# Patient Record
Sex: FEMALE | Race: WHITE
[De-identification: names, ages, dates, MRNs, and addresses within clinical notes are randomized per-mention and may not be internally consistent; named-entity substitution may affect disease eponyms.]

---

## 2019-12-18 ENCOUNTER — HOSPITAL ENCOUNTER (OUTPATIENT)
Dept: HOSPITAL 61 - PCVCIMAG | Age: 69
Discharge: HOME | End: 2019-12-18
Attending: INTERNAL MEDICINE
Payer: COMMERCIAL

## 2019-12-18 DIAGNOSIS — I73.9: Primary | ICD-10-CM

## 2019-12-18 DIAGNOSIS — E78.5: ICD-10-CM

## 2019-12-18 DIAGNOSIS — I10: ICD-10-CM

## 2019-12-18 PROCEDURE — 93017 CV STRESS TEST TRACING ONLY: CPT

## 2019-12-18 PROCEDURE — 93925 LOWER EXTREMITY STUDY: CPT

## 2019-12-18 PROCEDURE — A9500 TC99M SESTAMIBI: HCPCS

## 2019-12-18 PROCEDURE — 78452 HT MUSCLE IMAGE SPECT MULT: CPT

## 2019-12-18 NOTE — PCVCIMAG
EXAM: BILATERAL LOWER EXTREMITY ARTERIAL DUPLEX



INDICATION: Peripheral Arterial Disease. Leg pain.



FINDINGS: 

Right Leg: Satisfactory arterial waveforms throughout the

common/profunda/superficial femoral, popliteal, anterior tibial,

peroneal, and posterior tibial arteries. No flow limiting stenosis

seen.



Left Leg:  Satisfactory arterial waveforms throughout the

common/profunda/superficial femoral, popliteal, anterior tibial,

peroneal, and posterior tibial arteries. No flow limiting stenosis

seen.



IMPRESSION: 

No flow limiting stenosis in the right lower extremity.

No flow limiting stenosis in the left lower extremity.





LOC:[id number]

## 2019-12-18 NOTE — PCVCIMAG
--------------- APPROVED REPORT --------------





Imaging Protocol: Rest Tc-99m/Stress Tc-99m 1 day

Study performed:  12/18/2019 09:28:24



Indication: Dyspnea

Patient Location: Out-Patient

Stress Nurse: Gabriela Self RN, Alison Sutton RN

NM Tech:Jaz Bunnbarry [facility]



Ht: 5 ft 1 in Wt: 143 lbs BSA:  1.64 m2

HR: 51 bpm                      BP: 144/69 mmHg         BMI:  

27.01

Rhythm:  Sinus Bradycardia



Medical History

Medical History: Hyperlipidemia, HTN

Medications: Amlodipine, Aspirin, Altace

Allergies: PCN, Codeine, Statins

Cardiac Risk Factors: Current Smoker

Pretest Chest Pain Characteristics: No chest pain



Resting Data

Rest SPECT myocardial perfusion imaging was performed in supine 

position 45 minutes following the intravenous injection of 10.5 mCi 

of Tc-99m Sestamibi.

Time of rest injection: 0920     Date: 12/18/2019

Administration Route: IV

Administration Site: Right AC



Pharmacologic Stress

Pharmacologic stress test was performed by injecting Regadenoson 0.4 

mg IV push over 10-15 seconds immediately followed by the intravenous 

injection of 34.8 mCi of Tc-99m Sestamibi.

Time of stress injection: 1100     

Administration Route: IV

Administration Site: Right AC

Gated Stress SPECT was performed 45 minutes after stress 

injection.

The images were gated to evaluate regional wall motion and calculate 

left ventricular ejection fraction. 



Stress Test Details

Stress Test:  Pharmacologic stress testing performed using 0.4 mg of 

regadenoson per 5 mL given IV over 10 seconds.

  Reason for pharmacologic stress test: physical limitation, leg 

issues.



HRMax Heart Rate (APMHR): 151 bpm 

Resting HR:            51 bpmTarget HR (85% APMHR): 128 bpm

Max HR Achieved:  99 bpm

% of APMHR:         65

Recovery HR:            87 bpm



BP

Resting BP:  144/69 mmHg

Max BP:       148/77 mmHg

Recovery BP:       130/76 mmHg

ECG

Resting ECG:  Sinus Bradycardia

Stress ECG:     Sinus Rhythm

ST Change: Non-ischemic

Arrhythmia:    None

Recovery ECG: Sinus Rhythm



Clinical

Reason for Termination: Completed protocol

Stress Symptoms: Abdominal discomfort, Chest heaviness

Symptoms resolved with caffeine.



Study Quality

Study: Good



Study Data

Post stress, the left ventricular ejection was 73%..

SSS: 3

SRS: 0

SDS: 3

TID = 1.27.



Perfusion

There is a medium area of moderately reduced uptake in the mid and 

apical segment of the anterior wall which is seen on the stress 

images and improves on the resting images. This area thickens and 

moves normally and is most consistent with ischemia.



Wall Motion

Normal left ventricular wall motion.



Nuclear Conclusion

ECG Findings: negative for ischemia 

Clinical Findings: non-diagnostic 

Nuclear Findings: positive for ischemia 

Exercise Capacity: not assessed

Left Ventricular Function: normal 

This study reveals a reversible defect in the anterior wall, 

suggestive for ischemia.

There is normal global and segmental LV systolic function.